# Patient Record
Sex: FEMALE | Race: WHITE | NOT HISPANIC OR LATINO
[De-identification: names, ages, dates, MRNs, and addresses within clinical notes are randomized per-mention and may not be internally consistent; named-entity substitution may affect disease eponyms.]

---

## 2018-03-15 ENCOUNTER — RESULT REVIEW (OUTPATIENT)
Age: 23
End: 2018-03-15

## 2018-07-17 ENCOUNTER — APPOINTMENT (OUTPATIENT)
Dept: OTOLARYNGOLOGY | Facility: CLINIC | Age: 23
End: 2018-07-17

## 2018-07-24 ENCOUNTER — APPOINTMENT (OUTPATIENT)
Dept: OTOLARYNGOLOGY | Facility: CLINIC | Age: 23
End: 2018-07-24
Payer: COMMERCIAL

## 2018-07-24 VITALS
SYSTOLIC BLOOD PRESSURE: 109 MMHG | HEART RATE: 70 BPM | WEIGHT: 115 LBS | BODY MASS INDEX: 19.16 KG/M2 | HEIGHT: 65 IN | DIASTOLIC BLOOD PRESSURE: 76 MMHG

## 2018-07-24 DIAGNOSIS — J30.2 OTHER SEASONAL ALLERGIC RHINITIS: ICD-10-CM

## 2018-07-24 DIAGNOSIS — Z80.9 FAMILY HISTORY OF MALIGNANT NEOPLASM, UNSPECIFIED: ICD-10-CM

## 2018-07-24 PROCEDURE — 31231 NASAL ENDOSCOPY DX: CPT

## 2018-07-24 PROCEDURE — 99203 OFFICE O/P NEW LOW 30 MIN: CPT | Mod: 25

## 2018-09-12 ENCOUNTER — APPOINTMENT (OUTPATIENT)
Dept: OTOLARYNGOLOGY | Facility: CLINIC | Age: 23
End: 2018-09-12
Payer: COMMERCIAL

## 2018-09-12 VITALS
BODY MASS INDEX: 19.16 KG/M2 | HEIGHT: 65 IN | SYSTOLIC BLOOD PRESSURE: 123 MMHG | DIASTOLIC BLOOD PRESSURE: 79 MMHG | HEART RATE: 84 BPM | WEIGHT: 115 LBS

## 2018-09-12 DIAGNOSIS — J34.2 DEVIATED NASAL SEPTUM: ICD-10-CM

## 2018-09-12 DIAGNOSIS — J30.9 ALLERGIC RHINITIS, UNSPECIFIED: ICD-10-CM

## 2018-09-12 PROCEDURE — 99213 OFFICE O/P EST LOW 20 MIN: CPT

## 2018-09-24 ENCOUNTER — FORM ENCOUNTER (OUTPATIENT)
Age: 23
End: 2018-09-24

## 2018-09-25 ENCOUNTER — OUTPATIENT (OUTPATIENT)
Dept: OUTPATIENT SERVICES | Facility: HOSPITAL | Age: 23
LOS: 1 days | End: 2018-09-25
Payer: COMMERCIAL

## 2018-09-25 ENCOUNTER — APPOINTMENT (OUTPATIENT)
Dept: CT IMAGING | Facility: CLINIC | Age: 23
End: 2018-09-25

## 2018-09-25 DIAGNOSIS — Z00.8 ENCOUNTER FOR OTHER GENERAL EXAMINATION: ICD-10-CM

## 2018-09-25 PROCEDURE — 70486 CT MAXILLOFACIAL W/O DYE: CPT

## 2018-09-25 PROCEDURE — 70486 CT MAXILLOFACIAL W/O DYE: CPT | Mod: 26

## 2018-12-05 ENCOUNTER — CHART COPY (OUTPATIENT)
Age: 23
End: 2018-12-05

## 2018-12-24 ENCOUNTER — APPOINTMENT (OUTPATIENT)
Dept: NEUROLOGY | Facility: CLINIC | Age: 23
End: 2018-12-24

## 2018-12-31 ENCOUNTER — APPOINTMENT (OUTPATIENT)
Dept: NEUROLOGY | Facility: CLINIC | Age: 23
End: 2018-12-31
Payer: COMMERCIAL

## 2018-12-31 VITALS
SYSTOLIC BLOOD PRESSURE: 96 MMHG | HEART RATE: 67 BPM | BODY MASS INDEX: 18.99 KG/M2 | DIASTOLIC BLOOD PRESSURE: 68 MMHG | HEIGHT: 65 IN | WEIGHT: 114 LBS

## 2018-12-31 VITALS — SYSTOLIC BLOOD PRESSURE: 96 MMHG | DIASTOLIC BLOOD PRESSURE: 58 MMHG

## 2018-12-31 PROCEDURE — 99204 OFFICE O/P NEW MOD 45 MIN: CPT

## 2018-12-31 PROCEDURE — 95816 EEG AWAKE AND DROWSY: CPT

## 2019-01-10 ENCOUNTER — OTHER (OUTPATIENT)
Age: 24
End: 2019-01-10

## 2019-01-11 ENCOUNTER — FORM ENCOUNTER (OUTPATIENT)
Age: 24
End: 2019-01-11

## 2019-01-12 ENCOUNTER — APPOINTMENT (OUTPATIENT)
Dept: MRI IMAGING | Facility: CLINIC | Age: 24
End: 2019-01-12
Payer: COMMERCIAL

## 2019-01-12 ENCOUNTER — OUTPATIENT (OUTPATIENT)
Dept: OUTPATIENT SERVICES | Facility: HOSPITAL | Age: 24
LOS: 1 days | End: 2019-01-12
Payer: COMMERCIAL

## 2019-01-12 DIAGNOSIS — R43.0 ANOSMIA: ICD-10-CM

## 2019-01-12 DIAGNOSIS — R43.2 PARAGEUSIA: ICD-10-CM

## 2019-01-12 DIAGNOSIS — R43.9 UNSPECIFIED DISTURBANCES OF SMELL AND TASTE: ICD-10-CM

## 2019-01-12 PROCEDURE — 70551 MRI BRAIN STEM W/O DYE: CPT

## 2019-01-12 PROCEDURE — 70551 MRI BRAIN STEM W/O DYE: CPT | Mod: 26

## 2019-02-07 ENCOUNTER — APPOINTMENT (OUTPATIENT)
Dept: NEUROLOGY | Facility: CLINIC | Age: 24
End: 2019-02-07

## 2019-09-19 ENCOUNTER — OUTPATIENT (OUTPATIENT)
Dept: OUTPATIENT SERVICES | Facility: HOSPITAL | Age: 24
LOS: 1 days | Discharge: HOME | End: 2019-09-19
Payer: COMMERCIAL

## 2019-09-19 DIAGNOSIS — C85.10 UNSPECIFIED B-CELL LYMPHOMA, UNSPECIFIED SITE: ICD-10-CM

## 2019-09-23 ENCOUNTER — RESULT REVIEW (OUTPATIENT)
Age: 24
End: 2019-09-23

## 2019-09-23 ENCOUNTER — APPOINTMENT (OUTPATIENT)
Dept: HEMATOLOGY ONCOLOGY | Facility: CLINIC | Age: 24
End: 2019-09-23
Payer: COMMERCIAL

## 2019-09-23 VITALS
RESPIRATION RATE: 16 BRPM | OXYGEN SATURATION: 100 % | HEIGHT: 64.37 IN | BODY MASS INDEX: 18.63 KG/M2 | SYSTOLIC BLOOD PRESSURE: 111 MMHG | TEMPERATURE: 98.6 F | DIASTOLIC BLOOD PRESSURE: 71 MMHG | WEIGHT: 109.13 LBS | HEART RATE: 61 BPM

## 2019-09-23 DIAGNOSIS — R43.9 UNSPECIFIED DISTURBANCES OF SMELL AND TASTE: ICD-10-CM

## 2019-09-23 DIAGNOSIS — R16.0 HEPATOMEGALY, NOT ELSEWHERE CLASSIFIED: ICD-10-CM

## 2019-09-23 DIAGNOSIS — Z80.0 FAMILY HISTORY OF MALIGNANT NEOPLASM OF DIGESTIVE ORGANS: ICD-10-CM

## 2019-09-23 DIAGNOSIS — Z83.3 FAMILY HISTORY OF DIABETES MELLITUS: ICD-10-CM

## 2019-09-23 DIAGNOSIS — C88.4 EXTRANODAL MARGINAL ZONE B-CELL LYMPHOMA OF MUCOSA-ASSOCIATED LYMPHOID TISSUE [MALT-LYMPHOMA]: ICD-10-CM

## 2019-09-23 DIAGNOSIS — R43.2 PARAGEUSIA: ICD-10-CM

## 2019-09-23 DIAGNOSIS — R43.0 ANOSMIA: ICD-10-CM

## 2019-09-23 DIAGNOSIS — Z78.9 OTHER SPECIFIED HEALTH STATUS: ICD-10-CM

## 2019-09-23 LAB
BASOPHILS # BLD AUTO: 0 K/UL — SIGNIFICANT CHANGE UP (ref 0–0.2)
EOSINOPHIL # BLD AUTO: 0.2 K/UL — SIGNIFICANT CHANGE UP (ref 0–0.5)
EOSINOPHIL NFR BLD AUTO: 4 % — SIGNIFICANT CHANGE UP (ref 0–6)
HCT VFR BLD CALC: 39.3 % — SIGNIFICANT CHANGE UP (ref 34.5–45)
HGB BLD-MCNC: 13.6 G/DL — SIGNIFICANT CHANGE UP (ref 11.5–15.5)
LYMPHOCYTES # BLD AUTO: 2.1 K/UL — SIGNIFICANT CHANGE UP (ref 1–3.3)
LYMPHOCYTES # BLD AUTO: 42 % — SIGNIFICANT CHANGE UP (ref 13–44)
MCHC RBC-ENTMCNC: 29.9 PG — SIGNIFICANT CHANGE UP (ref 27–34)
MCHC RBC-ENTMCNC: 34.5 G/DL — SIGNIFICANT CHANGE UP (ref 32–36)
MCV RBC AUTO: 86.6 FL — SIGNIFICANT CHANGE UP (ref 80–100)
MONOCYTES # BLD AUTO: 0.6 K/UL — SIGNIFICANT CHANGE UP (ref 0–0.9)
MONOCYTES NFR BLD AUTO: 14 % — SIGNIFICANT CHANGE UP (ref 2–14)
NEUTROPHILS # BLD AUTO: 2.3 K/UL — SIGNIFICANT CHANGE UP (ref 1.8–7.4)
NEUTROPHILS NFR BLD AUTO: 39 % — LOW (ref 43–77)
NEUTS BAND # BLD: 1 % — SIGNIFICANT CHANGE UP (ref 0–8)
PLAT MORPH BLD: NORMAL — SIGNIFICANT CHANGE UP
PLATELET # BLD AUTO: 176 K/UL — SIGNIFICANT CHANGE UP (ref 150–400)
RBC # BLD: 4.54 M/UL — SIGNIFICANT CHANGE UP (ref 3.8–5.2)
RBC # FLD: 12.5 % — SIGNIFICANT CHANGE UP (ref 10.3–14.5)
RBC BLD AUTO: NORMAL — SIGNIFICANT CHANGE UP
WBC # BLD: 5.2 K/UL — SIGNIFICANT CHANGE UP (ref 3.8–10.5)
WBC # FLD AUTO: 5.2 K/UL — SIGNIFICANT CHANGE UP (ref 3.8–10.5)

## 2019-09-23 PROCEDURE — 99205 OFFICE O/P NEW HI 60 MIN: CPT

## 2019-09-23 PROCEDURE — 88321 CONSLTJ&REPRT SLD PREP ELSWR: CPT

## 2019-09-23 RX ORDER — AZELASTINE HYDROCHLORIDE 137 UG/1
0.1 SPRAY, METERED NASAL TWICE DAILY
Qty: 1 | Refills: 5 | Status: DISCONTINUED | COMMUNITY
Start: 2018-09-12 | End: 2019-09-23

## 2019-09-23 RX ORDER — FLUTICASONE PROPIONATE 50 UG/1
50 SPRAY, METERED NASAL DAILY
Qty: 1 | Refills: 5 | Status: DISCONTINUED | COMMUNITY
Start: 2018-07-24 | End: 2019-09-23

## 2019-09-23 RX ORDER — PREDNISONE 10 MG/1
10 TABLET ORAL TWICE DAILY
Qty: 15 | Refills: 1 | Status: DISCONTINUED | COMMUNITY
Start: 2018-07-24 | End: 2019-09-23

## 2019-09-23 RX ORDER — PREDNISONE 10 MG/1
10 TABLET ORAL TWICE DAILY
Qty: 20 | Refills: 2 | Status: DISCONTINUED | COMMUNITY
Start: 2018-10-02 | End: 2019-09-23

## 2019-09-23 RX ORDER — PREDNISONE 10 MG/1
10 TABLET ORAL TWICE DAILY
Qty: 20 | Refills: 2 | Status: DISCONTINUED | COMMUNITY
Start: 2018-12-05 | End: 2019-09-23

## 2019-09-23 NOTE — HISTORY OF PRESENT ILLNESS
[de-identified] : Ms. Romo is a 23 year old woman, with newly diagnosed stage IE extranodal marginal zone  lymphoma. She initially developed a nodule in the posterior aspect of the proximal LUE about 1 year ago. Overtime it became more prominent reaching a size of  2cm and it began to have a "bruised" appearance. There was no drainage. It was pruritic but not tender. \par She saw derm who referred her to Dr. Delacruz who performed a excisional bx on 9/5/19. The pathology was read at [facility] as extranodal marginal zone lymphoma with fragments of lymphoid tissue showing an increase in atypical lymphocytes(+) for CD20, Mum-1, BCL-2 and Kappa light chain restriction using RICHARD. Ki67 is described was low. Multiple irregular fragments were seen, precluding orientation of margins. \par Lymphoma was seen to extend to the edges of the tissue fragments.\par Congo red stain was (-).\par A PET/CT on 9/19/19 showed no hepatomegaly (20.5cm craniocaudal ) with no focal lesions or FDG uptake. The spleen was normal. There was adenopathy and no skin lesions were described. \par There was past hx of dysgeusia for which she had under went ENT, neurology evaluation. \par MRI of the brain in 1/2019 was negative  except for minimal ethmoid sinus mucosal thickening. These sxs have resolved over the last few months. \par She has had no constitutional c/o and is not aware of any other skin lesions. [de-identified] : Initial outpatient visit.

## 2019-09-23 NOTE — ASSESSMENT
[FreeTextEntry1] : CS IE extranodal marginal zone lymphoma in a 22 y/o asymptomatic pt. A solitary skin lesion was excised, possibly with close margins. She was assessed by Dr. Ramires for possible tx with RT. \par He was not in favor of this approach. From my standpoint, RT could be considered but only if a BM examination shows DIANA. In the absence of a marrow, a simple reexcision would be reasonable.\par It is not clear that any local therapy would have an impact on what is a very indolent lymphoma.\par I recommend that she have a dedicated full skin derm examination twice yearly.\par CMP, Immunoprotein studies, Borrelia serologies, LDH, viral serologies, B2MG will be drawn today.\par With respect to the hepatomegaly, this is going to be addressed today when the pt sees Dr. Levine, a Gastroenterologist in the [place] area.\par Of note the tumor itself stains negative for amyloid. The pt does not consume alcohol in significant amounts.\par It remains to be seen whether or not this represents an overreading of the imagining findings. \par There is no hepatomegaly on exam.\par

## 2019-09-23 NOTE — PHYSICAL EXAM
[Fully active, able to carry on all pre-disease performance without restriction] : Status 0 - Fully active, able to carry on all pre-disease performance without restriction [Normal] : affect appropriate [de-identified] : can not feel hepatomegaly  [de-identified] : LUE excsional site badges no other subcutaneous lesion.

## 2019-09-23 NOTE — ADDENDUM
[FreeTextEntry1] : Documented by Naldo Garces acting as a scribe for Dr. Nestor Barakat on 09/23/2019.\par \par All medical record entries made by a scribe were at my, Dr. Nestor Barakat direction and personally dictated by me on 09/23/2019. I have reviewed the chart and agree that the record accurately reflects my personal performance of the history, physical exam, procedure and imaging.\par

## 2019-09-23 NOTE — CONSULT LETTER
[Consult Letter:] : I had the pleasure of evaluating your patient, [unfilled]. [Dear  ___] : Dear  [unfilled], [Please see my note below.] : Please see my note below. [Consult Closing:] : Thank you very much for allowing me to participate in the care of this patient.  If you have any questions, please do not hesitate to contact me. [Sincerely,] : Sincerely, [FreeTextEntry2] : Lucero Proctor M.D.\[place] [id number] [FreeTextEntry3] : Nest[facility] Barakat M.D., [facility]\par  [DrZa  ___] : Dr. MAYFIELD [DrZa ___] : Dr. MAYFIELD

## 2019-09-25 LAB
ALBUMIN MFR SERPL ELPH: 68 %
ALBUMIN SERPL ELPH-MCNC: 4.9 G/DL
ALBUMIN SERPL-MCNC: 4.5 G/DL
ALBUMIN/GLOB SERPL: 2.1 RATIO
ALP BLD-CCNC: 53 U/L
ALPHA1 GLOB MFR SERPL ELPH: 3.2 %
ALPHA1 GLOB SERPL ELPH-MCNC: 0.2 G/DL
ALPHA2 GLOB MFR SERPL ELPH: 8.9 %
ALPHA2 GLOB SERPL ELPH-MCNC: 0.6 G/DL
ALT SERPL-CCNC: 10 U/L
ANION GAP SERPL CALC-SCNC: 12 MMOL/L
AST SERPL-CCNC: 18 U/L
B BURGDOR AB SER-IMP: NEGATIVE
B BURGDOR IGG+IGM SER QL: 0.06 INDEX
B-GLOBULIN MFR SERPL ELPH: 10.4 %
B-GLOBULIN SERPL ELPH-MCNC: 0.7 G/DL
B2 MICROGLOB SERPL-MCNC: 1.4 MG/L
BILIRUB SERPL-MCNC: 0.7 MG/DL
BUN SERPL-MCNC: 13 MG/DL
CALCIUM SERPL-MCNC: 9.6 MG/DL
CHLORIDE SERPL-SCNC: 104 MMOL/L
CO2 SERPL-SCNC: 24 MMOL/L
CREAT SERPL-MCNC: 0.95 MG/DL
GAMMA GLOB FLD ELPH-MCNC: 0.6 G/DL
GAMMA GLOB MFR SERPL ELPH: 9.5 %
GLUCOSE SERPL-MCNC: 100 MG/DL
HBV CORE IGG+IGM SER QL: NONREACTIVE
HBV SURFACE AB SER QL: NONREACTIVE
HBV SURFACE AG SER QL: NONREACTIVE
HCV AB SER QL: NONREACTIVE
HCV S/CO RATIO: 0.06 S/CO
HIV1+2 AB SPEC QL IA.RAPID: NONREACTIVE
IGA SER QL IEP: 140 MG/DL
IGG SER QL IEP: 680 MG/DL
IGM SER QL IEP: 56 MG/DL
INTERPRETATION SERPL IEP-IMP: NORMAL
LDH SERPL-CCNC: 180 U/L
POTASSIUM SERPL-SCNC: 5 MMOL/L
PROT SERPL-MCNC: 6.6 G/DL
SODIUM SERPL-SCNC: 140 MMOL/L
SURGICAL PATHOLOGY STUDY: SIGNIFICANT CHANGE UP

## 2024-06-06 ENCOUNTER — APPOINTMENT (OUTPATIENT)
Dept: ORTHOPEDIC SURGERY | Facility: CLINIC | Age: 29
End: 2024-06-06
Payer: COMMERCIAL

## 2024-06-06 DIAGNOSIS — M94.262 CHONDROMALACIA, LEFT KNEE: ICD-10-CM

## 2024-06-06 DIAGNOSIS — M21.6X9 OTHER ACQUIRED DEFORMITIES OF UNSPECIFIED FOOT: ICD-10-CM

## 2024-06-06 PROCEDURE — 99203 OFFICE O/P NEW LOW 30 MIN: CPT

## 2024-06-06 PROCEDURE — 73562 X-RAY EXAM OF KNEE 3: CPT | Mod: LT

## 2024-06-06 RX ORDER — DICLOFENAC SODIUM 75 MG/1
75 TABLET, DELAYED RELEASE ORAL TWICE DAILY
Qty: 60 | Refills: 5 | Status: ACTIVE | COMMUNITY
Start: 2024-06-06 | End: 1900-01-01

## 2024-06-06 NOTE — HISTORY OF PRESENT ILLNESS
[de-identified] : LOCATION: LEFT KNEE PAIN   DURATION: PAIN STARTED MARCH 2024 -  NO SPECIFIC INJURY - RUNS FOR EXERCISES ONCE WEEK , SOUL CYCLE ONCE WEEK  QUALITY: SHARP / ACHY  / DULL   INTERMITTENT  PAIN LEVEL: 6/10  TREATMENTS: PATIENT HAS TRIED RESTING  AGGRAVATING FACTORS: PAIN WORSENS WITH BENDING, WALKING  PAIN WORSE DURING THE DAY  ASSOCIATED CLICKING/LOCKING/POPPING/GRINDING

## 2024-06-06 NOTE — DISCUSSION/SUMMARY
[de-identified] : PATELLOFEMORAL SYNDROME / CHONDROMALACIA  XRAYS REVIEWED = WNL, + TILT  DICLOFENAC  PRESCRIBED 2-3 WEEKS  ARCH SUPPORTS FOR RUNNING AND SPORTS  HOME STRETCH + VMO PROGRAM - CONSIDER USING FOAM ROLLER  PT 2 X 4 WEEKS  RETURN TO RUNNING / SPORTS AS SYMPTOMS ALLOW    MRI IF NO RELIEF  CONSIDER KENALOG, VISCUSUPPLEMENT OR PRP  INJECTION  CONSIDER PF BRACING  CONSIDER ARTHROSCOPY IF NO RELIEF AFTER 6-9 MONTHS TREATMENT

## 2024-06-06 NOTE — PHYSICAL EXAM
[de-identified] : PHYSICAL EXAM LEFT KNEE  AROM EXTENSION = 0 FLEXION = 130   SPECIAL TESTS  PATELLAR GRIND = NEG DRAWER  = NEG LACHMAN = NEG MACMURRAY = NEG   MOTOR = GROSSLY INTACT SENSORY = GROSSLY INTACT    [de-identified] : XRAY LEFT KNEE: 4 views of the knee No obvious fracture or dislocation.  Alignment within normal limits